# Patient Record
Sex: FEMALE | Race: WHITE | NOT HISPANIC OR LATINO | Employment: FULL TIME | URBAN - METROPOLITAN AREA
[De-identification: names, ages, dates, MRNs, and addresses within clinical notes are randomized per-mention and may not be internally consistent; named-entity substitution may affect disease eponyms.]

---

## 2018-07-11 ENCOUNTER — HOSPITAL ENCOUNTER (OUTPATIENT)
Dept: OBGYN | Facility: HOSPITAL | Age: 28
Discharge: HOME HEALTH | End: 2018-07-11
Attending: OBSTETRICS & GYNECOLOGY | Admitting: OBSTETRICS & GYNECOLOGY

## 2018-07-11 LAB — RUPTURE OF FETAL MEMBRANES BY ROM PLUS: NEGATIVE

## 2018-07-11 RX ORDER — DIETARY SUPPLEMENT
1 CAPSULE ORAL DAILY
Status: SHIPPED | COMMUNITY
Start: 2013-02-11

## 2018-07-11 ASSESSMENT — MIFFLIN-ST. JEOR: SCORE: 1545.03

## 2018-08-12 ENCOUNTER — COMMUNICATION - HEALTHEAST (OUTPATIENT)
Dept: OBGYN | Facility: HOSPITAL | Age: 28
End: 2018-08-12

## 2018-08-12 ENCOUNTER — HOSPITAL ENCOUNTER (OUTPATIENT)
Dept: OBGYN | Facility: HOSPITAL | Age: 28
Discharge: HOME | End: 2018-08-12
Attending: OBSTETRICS & GYNECOLOGY | Admitting: OBSTETRICS & GYNECOLOGY

## 2018-08-12 LAB — RUPTURE OF FETAL MEMBRANES BY ROM PLUS: NEGATIVE

## 2018-08-15 ENCOUNTER — COMMUNICATION - HEALTHEAST (OUTPATIENT)
Dept: OBGYN | Facility: HOSPITAL | Age: 28
End: 2018-08-15

## 2018-08-16 ENCOUNTER — ANESTHESIA - HEALTHEAST (OUTPATIENT)
Dept: OBGYN | Facility: HOSPITAL | Age: 28
End: 2018-08-16

## 2018-08-19 ENCOUNTER — HOME CARE/HOSPICE - HEALTHEAST (OUTPATIENT)
Dept: HOME HEALTH SERVICES | Facility: HOME HEALTH | Age: 28
End: 2018-08-19

## 2018-08-20 ENCOUNTER — HOSPITAL ENCOUNTER (OUTPATIENT)
Dept: OBGYN | Facility: HOSPITAL | Age: 28
Discharge: HOME | End: 2018-08-20

## 2018-08-20 ENCOUNTER — HOME CARE/HOSPICE - HEALTHEAST (OUTPATIENT)
Dept: HOME HEALTH SERVICES | Facility: HOME HEALTH | Age: 28
End: 2018-08-20

## 2018-08-21 ENCOUNTER — COMMUNICATION - HEALTHEAST (OUTPATIENT)
Dept: OBGYN | Facility: HOSPITAL | Age: 28
End: 2018-08-21

## 2019-01-19 ENCOUNTER — OFFICE VISIT - HEALTHEAST (OUTPATIENT)
Dept: FAMILY MEDICINE | Facility: CLINIC | Age: 29
End: 2019-01-19

## 2019-01-19 DIAGNOSIS — R05.9 COUGH: ICD-10-CM

## 2019-01-19 DIAGNOSIS — J01.00 ACUTE MAXILLARY SINUSITIS, RECURRENCE NOT SPECIFIED: ICD-10-CM

## 2019-01-19 DIAGNOSIS — H10.33 ACUTE BACTERIAL CONJUNCTIVITIS OF BOTH EYES: ICD-10-CM

## 2019-01-19 DIAGNOSIS — R07.0 THROAT PAIN: ICD-10-CM

## 2019-01-19 DIAGNOSIS — J02.9 ACUTE PHARYNGITIS, UNSPECIFIED ETIOLOGY: ICD-10-CM

## 2019-01-19 LAB
BASOPHILS # BLD AUTO: 0 THOU/UL (ref 0–0.2)
BASOPHILS NFR BLD AUTO: 1 % (ref 0–2)
EOSINOPHIL # BLD AUTO: 0.1 THOU/UL (ref 0–0.4)
EOSINOPHIL NFR BLD AUTO: 2 % (ref 0–6)
ERYTHROCYTE [DISTWIDTH] IN BLOOD BY AUTOMATED COUNT: 11.5 % (ref 11–14.5)
FLUAV AG SPEC QL IA: NORMAL
FLUBV AG SPEC QL IA: NORMAL
HCT VFR BLD AUTO: 39.4 % (ref 35–47)
HGB BLD-MCNC: 13.2 G/DL (ref 12–16)
LYMPHOCYTES # BLD AUTO: 1.5 THOU/UL (ref 0.8–4.4)
LYMPHOCYTES NFR BLD AUTO: 20 % (ref 20–40)
MCH RBC QN AUTO: 30.6 PG (ref 27–34)
MCHC RBC AUTO-ENTMCNC: 33.5 G/DL (ref 32–36)
MCV RBC AUTO: 91 FL (ref 80–100)
MONOCYTES # BLD AUTO: 0.7 THOU/UL (ref 0–0.9)
MONOCYTES NFR BLD AUTO: 9 % (ref 2–10)
MONOCYTES NFR BLD AUTO: NEGATIVE %
NEUTROPHILS # BLD AUTO: 5 THOU/UL (ref 2–7.7)
NEUTROPHILS NFR BLD AUTO: 69 % (ref 50–70)
PLATELET # BLD AUTO: 190 THOU/UL (ref 140–440)
PMV BLD AUTO: 7.9 FL (ref 7–10)
RBC # BLD AUTO: 4.31 MILL/UL (ref 3.8–5.4)
WBC: 7.3 THOU/UL (ref 4–11)

## 2019-01-19 RX ORDER — PREDNISONE 20 MG/1
TABLET ORAL
Qty: 15 TABLET | Refills: 0 | Status: SHIPPED | OUTPATIENT
Start: 2019-01-19

## 2019-01-19 RX ORDER — POLYMYXIN B SULFATE AND TRIMETHOPRIM 1; 10000 MG/ML; [USP'U]/ML
1 SOLUTION OPHTHALMIC 4 TIMES DAILY
Qty: 10 ML | Refills: 0 | Status: SHIPPED | OUTPATIENT
Start: 2019-01-19

## 2019-01-19 RX ORDER — DOCUSATE SODIUM 100 MG/1
100 CAPSULE, LIQUID FILLED ORAL 2 TIMES DAILY
Status: SHIPPED | COMMUNITY
Start: 2019-01-19

## 2020-01-30 ENCOUNTER — OFFICE VISIT - HEALTHEAST (OUTPATIENT)
Dept: FAMILY MEDICINE | Facility: CLINIC | Age: 30
End: 2020-01-30

## 2020-01-30 DIAGNOSIS — J06.9 VIRAL UPPER RESPIRATORY TRACT INFECTION WITH COUGH: ICD-10-CM

## 2020-01-30 DIAGNOSIS — R68.89 FLU-LIKE SYMPTOMS: ICD-10-CM

## 2020-01-30 LAB
DEPRECATED S PYO AG THROAT QL EIA: NORMAL
FLUAV AG SPEC QL IA: NORMAL
FLUBV AG SPEC QL IA: NORMAL

## 2020-01-31 LAB — GROUP A STREP BY PCR: NORMAL

## 2021-06-01 VITALS — HEIGHT: 70 IN | BODY MASS INDEX: 23.98 KG/M2 | WEIGHT: 167.5 LBS

## 2021-06-02 VITALS — BODY MASS INDEX: 22.27 KG/M2 | WEIGHT: 155.2 LBS

## 2021-06-04 VITALS
DIASTOLIC BLOOD PRESSURE: 72 MMHG | BODY MASS INDEX: 23.1 KG/M2 | HEART RATE: 84 BPM | TEMPERATURE: 97.9 F | SYSTOLIC BLOOD PRESSURE: 107 MMHG | WEIGHT: 161 LBS | OXYGEN SATURATION: 97 %

## 2021-06-05 NOTE — PROGRESS NOTES
Walk In Care Note                                                        Date of Visit: 1/30/2020     Chief Complaint   Melva Adams is a(n) 30 y.o. White or  female who presents to [facility] with the following complaint(s):  Sore Throat and Fever (x 3 days)       Assessment and Plan   1. Viral upper respiratory tract infection with cough    2. Flu-like symptoms  - Rapid Strep A Screen-Throat  - Influenza A/B Rapid Test- Nasal Swab  - Group A Strep, RNA Direct Detection, Throat      Strep screen is negative. Reflex strep testing is in process; will prescribe amoxicillin if positive. Influenza A/B test negative. Discussed symptomatic/supportive cares, including rest, hydration, and use of alternating doses of over-the-counter acetaminophen and ibuprofen as needed to manage fever and discomfort.     Counseled patient regarding assessment and plan for evaluation and treatment. Questions were answered. See AVS for the specific written instructions and educational handout(s) regarding viral URI that were provided at the conclusion of the visit.     Discussed signs / symptoms that warrant urgent / emergent medical attention.     Follow up with Primary Care in 1 week if symptoms persist.      History of Present Illness   Primary symptom: Flu / Cold / Cough  Onset: 2 days ago  Progression: Persisting, improved slightly today  Fevers: Yes, Tmax 103.4 F  Chills: Yes  Sore throat: Yes  Nasal congestion: Yes  Rhinorrhea: Yes, clear, yellow at times  Ear pain: Mild, right  Headache: Yes  Body aches: Yes  Cough: Yes, wet  Shortness of breath: No  GI symptoms: Nausea and loose stools  Additional symptoms: Sinus pressure  Home therapies utilized: Acetaminophen and ibuprofen  Underlying lung disease: No  Other ill contacts: Daughter has had cold / cough and tested negative for strep, influenza, and RSV 5-6 days ago.      Review of Systems   Review of Systems   All other systems reviewed and are negative.        Physical Exam   Vitals:    01/30/20 1016   BP: 107/72   Patient Site: Right Arm   Patient Position: Sitting   Cuff Size: Adult Regular   Pulse: 84   Temp: 97.9  F (36.6  C)   TempSrc: Oral   SpO2: 97%   Weight: 161 lb (73 kg)     Physical Exam  Vitals signs and nursing note reviewed.   Constitutional:       General: She is not in acute distress.     Appearance: She is well-developed and normal weight. She is not ill-appearing or toxic-appearing.   HENT:      Head: Normocephalic and atraumatic.      Right Ear: Tympanic membrane, ear canal and external ear normal.      Left Ear: Tympanic membrane, ear canal and external ear normal.      Nose: Mucosal edema present. No rhinorrhea.      Mouth/Throat:      Mouth: Mucous membranes are moist. No oral lesions.      Pharynx: Uvula midline. No oropharyngeal exudate or posterior oropharyngeal erythema.      Tonsils: No tonsillar exudate. 1+ on the right. 1+ on the left.   Eyes:      General: Lids are normal.      Conjunctiva/sclera: Conjunctivae normal.   Neck:      Musculoskeletal: Neck supple. No edema or erythema.   Cardiovascular:      Rate and Rhythm: Normal rate and regular rhythm.      Heart sounds: S1 normal and S2 normal. No murmur. No friction rub. No gallop.    Pulmonary:      Effort: Pulmonary effort is normal.      Breath sounds: Normal breath sounds. No stridor. No wheezing, rhonchi or rales.   Lymphadenopathy:      Cervical: No cervical adenopathy.   Skin:     General: Skin is warm and dry.      Coloration: Skin is not pale.      Findings: No rash.   Neurological:      General: No focal deficit present.      Mental Status: She is alert and oriented to person, place, and time.          Diagnostic Studies   Laboratory:  Results for orders placed or performed in visit on 01/30/20   Rapid Strep A Screen-Throat   Result Value Ref Range    Rapid Strep A Antigen No Group A Strep detected, presumptive negative No Group A Strep detected, presumptive negative    Influenza A/B Rapid Test- Nasal Swab   Result Value Ref Range    Influenza  A, Rapid Antigen No Influenza A antigen detected No Influenza A antigen detected    Influenza B, Rapid Antigen No Influenza B antigen detected No Influenza B antigen detected     Radiology:  N/A  Electrocardiogram:  N/A     Procedure Note   N/A     Pertinent History   The following portions of the patient's history were reviewed and updated as appropriate: allergies, current medications, past family history, past medical history, past social history, past surgical history and problem list.    Patient has Vaginal delivery on their problem list.    Patient has a past medical history of Mental disorder.    Patient has a past surgical history that includes Bunionectomy (2003); left ovary (Left, 2017); and Dilation and curettage of uterus (2017).    Patient's family history is not on file.    Patient reports that she has never smoked. She has never used smokeless tobacco. She reports that she does not drink alcohol or use drugs.     Portions of this note have been dictated using voice recognition software. Any grammatical or contextual distortions are unintentional and inherent to the software.    Billy Wright MD  [facility], [place]

## 2021-06-18 NOTE — PATIENT INSTRUCTIONS - HE
Patient Instructions by Billy Wright MD at 1/30/2020 10:10 AM     Author: Billy Wright MD Service: -- Author Type: Physician    Filed: 1/30/2020 11:23 AM Encounter Date: 1/30/2020 Status: Addendum    : Billy Wright MD (Physician)    Related Notes: Original Note by Billy Wright MD (Physician) filed at 1/30/2020 11:22 AM       -Rapid strep test is negative.  A confirmatory strep test is in process and will be finalized tomorrow.  -We will only reach out to you if the confirmatory strep test is positive.  An antibiotic will be prescribed if this test is positive.  -Influenza A & B test is negative.  -Recommend rest, hydration, and over the counter pain relievers as needed for fever and discomfort.  Patient Education     Viral Upper Respiratory Illness (Adult)  You have a viral upper respiratory illness (URI), which is another term for the common cold. This illness is contagious during the first few days. It is spread through the air by coughing and sneezing. It may also be spread by direct contact (touching the sick person and then touching your own eyes, nose, or mouth). Frequent handwashing will decrease risk of spread. Most viral illnesses go away within 7 to 10 days with rest and simple home remedies. Sometimes the illness may last for several weeks. Antibiotics will not kill a virus, and they are generally not prescribed for this condition.    Home care    If symptoms are severe, rest at home for the first 2 to 3 days. When you resume activity, don't let yourself get too tired.    Avoid being exposed to cigarette smoke (yours or others).    You may use acetaminophen or ibuprofen to control pain and fever, unless another medicine was prescribed. If you have chronic liver or kidney disease, have ever had a stomach ulcer or gastrointestinal bleeding, or are taking blood-thinning medicines, talk with your healthcare provider before using these medicines. Aspirin should never be given  to anyone under 18 years of age who is ill with a viral infection or fever. It may cause severe liver or brain damage.    Your appetite may be poor, so a light diet is fine. Avoid dehydration by drinking 6 to 8 glasses of fluids per day (water, soft drinks, juices, tea, or soup). Extra fluids will help loosen secretions in the nose and lungs.    Over-the-counter cold medicines will not shorten the length of time youre sick, but they may be helpful for the following symptoms: cough, sore throat, and nasal and sinus congestion. (Note: Do not use decongestants if you have high blood pressure.)  Follow-up care  Follow up with your healthcare provider, or as advised.  When to seek medical advice  Call your healthcare provider right away if any of these occur:    Cough with lots of colored sputum (mucus)    Severe headache; face, neck, or ear pain    Difficulty swallowing due to throat pain    Fever of 100.4 F (38 C) or higher, or as directed by your healthcare provider  Call 911  Call 911 if any of these occur:    Chest pain, shortness of breath, wheezing, or difficulty breathing    Coughing up blood    Inability to swallow due to throat pain  Date Last Reviewed: 9/13/2015 2000-2017 [employer]. [address] [place] [address], [place] [address]. All rights reserved. This information is not intended as a substitute for professional medical care. Always follow your healthcare professional's instructions.

## 2021-06-19 NOTE — ANESTHESIA PROCEDURE NOTES
Epidural Block    Patient location during procedure: OB  Time Called: 8/16/2018 7:35 AM  Reason for Block:labor epidural  Staffing:  Performing  Anesthesiologist: SKYLER GÓMEZ  Preanesthetic Checklist  Completed: patient identified, risks, benefits, and alternatives discussed, timeout performed, consent obtained, airway assessed, oxygen available, suction available, emergency drugs available and hand hygiene performed  Procedure  Patient position: sitting  Prep: ChloraPrep  Patient monitoring: continuous pulse oximetry, heart rate and blood pressure  Approach: midline  Location: L1-L2  Injection technique: GARLAND saline  Number of Attempts:1  Needle  Needle type: Carlos Eduardo   Needle gauge: 18 G     Catheter in Space: 5 (GARLAND at 5 cm)  Assessment  Sensory level:  No complications    Events: paresthesia     Additional Notes:  Transient paresthesia down left leg, resolved when catheter pulled back

## 2021-06-19 NOTE — ANESTHESIA PREPROCEDURE EVALUATION
Anesthesia Evaluation      Patient summary reviewed   No history of anesthetic complications     Airway   Mallampati: II  Neck ROM: full   Pulmonary     breath sounds clear to auscultation  (-) COPD, asthma, shortness of breath, recent URI, sleep apnea, rhonchi, wheezes, rales, stridor, not a smoker                         Cardiovascular   Exercise tolerance: > or = 4 METS  (-) hypertension, past MI, CAD, angina, CHF, murmur  ECG reviewed  Rhythm: regular  Rate: normal,    no murmur      Neuro/Psych    (+) anxiety/panic attacks,   (-) no seizures, no CVA, no depression, no dementia, no chronic pain    Endo/Other    (+) pregnant  (-) no diabetes, hypothyroidism, no obesity     GI/Hepatic/Renal    (+) GERD,        Other findings: Labs 8/1 6/18:  Hgb 12.4, Plt 135      Dental - normal exam     Comment: No loose, chipped, partial, removable or dentures.                         Anesthesia Plan  Planned anesthetic: epidural    ASA 2     Anesthetic plan and risks discussed with: patient, spouse and parent/guardian    Post-op plan: routine recovery

## 2021-06-19 NOTE — PROGRESS NOTES
Pt arrived [facility] unit around 0230 with c/o rupture of membrane and less fetal movement per pt. ROMplus done. Applied EFM. NST reactive. ROMplus negative. Dr. Noel updated. Received order to discharge patient.   Discharge instruction and follow up explained, pt verbalizes understanding.

## 2021-06-19 NOTE — ANESTHESIA POSTPROCEDURE EVALUATION
Patient: Melva Adams  * No procedures listed *  Anesthesia type: epidural    Patient location: Labor and Delivery  Last vitals:   Vitals:    08/17/18 0340   BP: 99/54   Pulse: 64   Resp: 18   Temp: 36.8  C (98.3  F)   SpO2:      Post vital signs: stable  Level of consciousness: awake and responds to simple questions  Post-anesthesia pain: pain controlled  Post-anesthesia nausea and vomiting: no  Pulmonary: unassisted, return to baseline  Cardiovascular: stable and blood pressure at baseline  Hydration: adequate  Anesthetic events: no    QCDR Measures:  ASA# 11 - Nani-op Cardiac Arrest: ASA11B - Patient did NOT experience unanticipated cardiac arrest  ASA# 12 - Nani-op Mortality Rate: ASA12B - Patient did NOT die  ASA# 13 - PACU Re-Intubation Rate: NA - No ETT / LMA used for case  ASA# 10 - Composite Anes Safety: ASA10A - No serious adverse event    Additional Notes:

## 2021-06-23 NOTE — PATIENT INSTRUCTIONS - HE
Fluids, rest, steam, nasal saline, humidifier  Prednisone 40mg daily for 3-5 days to help with throat inflammation and swelling and pain  Polytrim eye drops both eyes 4 times a day for a week or so  If congestion and sinus pressure persists into this week, may need to reevaluate for a secondary bacterial sinus infection though at this time it all seems very viral still  Influenza test and mono test were negative, normal white count

## 2021-06-23 NOTE — PROGRESS NOTES
Assessment/Plan:   Throat pain, Cough  Acute bacterial conjunctivitis of both eyes  Acute pharyngitis, unspecified etiology  Acute maxillary sinusitis, recurrence not specified  Off and on URI for weeks. Last Saturday developed ST and cough.  Fever, chills, body aches on Tuesday. Peaked on Thursday. RST negative that day at another clinic. Goopy eyes since yesterday. Sinus pain and pressure and throat still very painful. Suspect new acute viral illness, flu negative, monospot negative. CBC normal. Green nasal drainage, also consider secondary sinusitis from prolonged congestion. Will cover with antibiotics and eyedrops and some prednisone she can use for throat swelling and pain if not improving with antiboitics. Breast feeding, daughter has been on amoxicillin and Augmentin for ear infections recently.     - Mononucleosis Screen  - HM1(CBC and Differential)  - HM1 (CBC with Diff)  - Influenza A/B Rapid Test  - polymyxin B-trimethoprim (FOR POLYTRIM) 10,000 unit- 1 mg/mL Drop ophthalmic drops; Administer 1 drop to both eyes 4 (four) times a day.  Dispense: 10 mL; Refill: 0  - predniSONE (DELTASONE) 20 MG tablet; 40mg daily for 3-5 days.  Dispense: 15 tablet; Refill: 0  - amoxicillin-clavulanate (AUGMENTIN) 875-125 mg per tablet; Take 1 tablet by mouth 2 (two) times a day for 10 days.  Dispense: 20 tablet; Refill: 0    Fluids, rest, steam, nasal saline, humidifier  Prednisone 40mg daily for 3-5 days to help with throat inflammation and swelling and pain  Polytrim eye drops both eyes 4 times a day for a week or so  If congestion and sinus pressure persists into this week, may need to reevaluate for a secondary bacterial sinus infection though at this time it all seems very viral still  Influenza test and mono test were negative, normal white count    Subjective:      Melva Adams is a 28 y.o. female who presents with throat pain, congestion and a goopy eye. She has been sick for over a week now. She has had URI  and cough and ST off and on for weeks - she has an infant in  and she has been ill with something - pneumonia, ear infections - since November. A week ago, on Saturday, she developed a very painful throat. On Sunday, more congestion and cough.  Tuesday and Wednesday she had fever and chills and body aches which peaked on Thursday when she went in for a strep test somewhere else which was negative. She has continued to have severe ST making it hard to swallow and eat. Increasing sinus pain and pressure as well the last couple days and since last night, she has had a goopy eye.  It started on the right side - she thinks she had an eyelash in the eye and in the process of removing it caused an infection because it has had green mucus and tearing and redness since then.  Today it has moved to the left as well. No photophobia, no foreign body sensation any longer. Her  has started having a ST as well and her daughter is congested and coughing. She is breastfeeding. Her daughter recently completed amoxicillin followed by augmentin and tolerated those but doesn't really seem better. No wheeze or shortness of breath, no leg swelling or calf tenderness, no urinary sxs, no rash. No breast tenderness, firmness, pain or redness to suggest a mastitis causing malaise and fever.  She has been fatigued and not hungry but is trying to drink enough. Adequate milk supply. No history of asthma, not smoking. NKDA    Allergies   Allergen Reactions     Gluten Diarrhea     sensitivity     Milk Diarrhea     Milk products     Soy Diarrhea     Current Outpatient Medications on File Prior to Visit   Medication Sig Dispense Refill     calcium carbonate-vitamin D3 (CALCIUM 500 + D, D3,) 500 mg(1,250mg) -125 unit per tablet Take 1 capsule by mouth daily.       dietary supplement cap Take 1 capsule by mouth daily.       docusate sodium (COLACE) 100 MG capsule Take 100 mg by mouth 2 (two) times a day.       No current  facility-administered medications on file prior to visit.      Patient Active Problem List   Diagnosis     Vaginal delivery       Objective:     /65 (Patient Site: Right Arm, Patient Position: Sitting, Cuff Size: Adult Regular)   Pulse 71   Temp 98.2  F (36.8  C) (Oral)   Wt 155 lb 3.2 oz (70.4 kg)   SpO2 98%   Breastfeeding? Yes   BMI 22.27 kg/m      Physical  General Appearance: Alert, cooperative, no distress, ill appearing  Head: Normocephalic, without obvious abnormality, atraumatic  Eyes: Conjunctivae are injected both bulbar and palpebral surface R>L. Scant green mucus in the corners and lashes (lower). Extraocular movements are intact. PERRLA, no photophobia  Ears: Normal TMs and external ear canals, both ears  Nose: congestion, red swollen turbinates with sinus pain on percussion.  Throat: Throat is red, mainly posteriorly, mildly enlarged tonsils bilaterally, uvula midline. No exudate.  No significant lesions or palatal petechiae.   Neck: tender anterior adenopathy  Lungs: Clear to auscultation bilaterally, respirations unlabored  Heart: Regular rate and rhythm  Abdomen: Soft, non-tender, no liver or spleen tenderness or enlargement, no CVA tenderness with percussion  Skin: Skin color, texture, turgor normal, no rashes or lesions  Psychiatric: easily tearful.        Recent Results (from the past 24 hour(s))   Influenza A/B Rapid Test   Result Value Ref Range    Influenza  A, Rapid Antigen No Influenza A antigen detected No Influenza A antigen detected    Influenza B, Rapid Antigen No Influenza B antigen detected No Influenza B antigen detected   Mononucleosis Screen   Result Value Ref Range    Mono Screen Negative Negative   HM1 (CBC with Diff)   Result Value Ref Range    WBC 7.3 4.0 - 11.0 thou/uL    RBC 4.31 3.80 - 5.40 mill/uL    Hemoglobin 13.2 12.0 - 16.0 g/dL    Hematocrit 39.4 35.0 - 47.0 %    MCV 91 80 - 100 fL    MCH 30.6 27.0 - 34.0 pg    MCHC 33.5 32.0 - 36.0 g/dL    RDW 11.5 11.0 -  14.5 %    Platelets 190 140 - 440 thou/uL    MPV 7.9 7.0 - 10.0 fL    Neutrophils % 69 50 - 70 %    Lymphocytes % 20 20 - 40 %    Monocytes % 9 2 - 10 %    Eosinophils % 2 0 - 6 %    Basophils % 1 0 - 2 %    Neutrophils Absolute 5.0 2.0 - 7.7 thou/uL    Lymphocytes Absolute 1.5 0.8 - 4.4 thou/uL    Monocytes Absolute 0.7 0.0 - 0.9 thou/uL    Eosinophils Absolute 0.1 0.0 - 0.4 thou/uL    Basophils Absolute 0.0 0.0 - 0.2 thou/uL

## 2021-06-26 ENCOUNTER — HEALTH MAINTENANCE LETTER (OUTPATIENT)
Age: 31
End: 2021-06-26

## 2021-07-14 PROBLEM — Z34.90 PREGNANT: Status: RESOLVED | Noted: 2018-08-16 | Resolved: 2018-08-17

## 2021-10-16 ENCOUNTER — HEALTH MAINTENANCE LETTER (OUTPATIENT)
Age: 31
End: 2021-10-16

## 2021-12-16 DIAGNOSIS — Z33.1 PREGNANT STATE, INCIDENTAL: Primary | ICD-10-CM

## 2021-12-17 ENCOUNTER — LAB (OUTPATIENT)
Dept: LAB | Facility: CLINIC | Age: 31
End: 2021-12-17
Attending: NURSE PRACTITIONER
Payer: COMMERCIAL

## 2021-12-17 DIAGNOSIS — Z33.1 PREGNANT STATE, INCIDENTAL: ICD-10-CM

## 2021-12-17 LAB — SARS-COV-2 RNA RESP QL NAA+PROBE: NEGATIVE

## 2021-12-17 PROCEDURE — U0005 INFEC AGEN DETEC AMPLI PROBE: HCPCS

## 2021-12-20 ENCOUNTER — HOSPITAL ENCOUNTER (INPATIENT)
Facility: HOSPITAL | Age: 31
LOS: 3 days | Discharge: HOME HEALTH | End: 2021-12-23
Attending: OBSTETRICS & GYNECOLOGY | Admitting: OBSTETRICS & GYNECOLOGY
Payer: COMMERCIAL

## 2021-12-20 PROBLEM — Z34.90 ENCOUNTER FOR ELECTIVE INDUCTION OF LABOR: Status: ACTIVE | Noted: 2021-12-20

## 2021-12-20 PROBLEM — Z36.89 ENCOUNTER FOR TRIAGE IN PREGNANT PATIENT: Status: ACTIVE | Noted: 2021-12-20

## 2021-12-20 LAB
ABO/RH(D): NORMAL
ANTIBODY SCREEN: NEGATIVE
HEPATITIS B SURFACE ANTIGEN (EXTERNAL): NONREACTIVE
HOLD SPECIMEN: NORMAL
SPECIMEN EXPIRATION DATE: NORMAL

## 2021-12-20 PROCEDURE — 120N000001 HC R&B MED SURG/OB

## 2021-12-20 PROCEDURE — 36415 COLL VENOUS BLD VENIPUNCTURE: CPT | Performed by: OBSTETRICS & GYNECOLOGY

## 2021-12-20 PROCEDURE — 86901 BLOOD TYPING SEROLOGIC RH(D): CPT | Performed by: OBSTETRICS & GYNECOLOGY

## 2021-12-20 PROCEDURE — 250N000013 HC RX MED GY IP 250 OP 250 PS 637: Performed by: OBSTETRICS & GYNECOLOGY

## 2021-12-20 PROCEDURE — 3E0P7VZ INTRODUCTION OF HORMONE INTO FEMALE REPRODUCTIVE, VIA NATURAL OR ARTIFICIAL OPENING: ICD-10-PCS | Performed by: OBSTETRICS & GYNECOLOGY

## 2021-12-20 PROCEDURE — 86780 TREPONEMA PALLIDUM: CPT | Performed by: OBSTETRICS & GYNECOLOGY

## 2021-12-20 RX ORDER — NALOXONE HYDROCHLORIDE 0.4 MG/ML
0.2 INJECTION, SOLUTION INTRAMUSCULAR; INTRAVENOUS; SUBCUTANEOUS
Status: DISCONTINUED | OUTPATIENT
Start: 2021-12-20 | End: 2021-12-21 | Stop reason: HOSPADM

## 2021-12-20 RX ORDER — MISOPROSTOL 100 UG/1
25 TABLET ORAL
Status: DISCONTINUED | OUTPATIENT
Start: 2021-12-20 | End: 2021-12-21 | Stop reason: HOSPADM

## 2021-12-20 RX ORDER — NALOXONE HYDROCHLORIDE 0.4 MG/ML
0.4 INJECTION, SOLUTION INTRAMUSCULAR; INTRAVENOUS; SUBCUTANEOUS
Status: DISCONTINUED | OUTPATIENT
Start: 2021-12-20 | End: 2021-12-21 | Stop reason: HOSPADM

## 2021-12-20 RX ORDER — METOCLOPRAMIDE HYDROCHLORIDE 5 MG/ML
10 INJECTION INTRAMUSCULAR; INTRAVENOUS EVERY 6 HOURS PRN
Status: DISCONTINUED | OUTPATIENT
Start: 2021-12-20 | End: 2021-12-21 | Stop reason: HOSPADM

## 2021-12-20 RX ORDER — MISOPROSTOL 200 UG/1
800 TABLET ORAL
Status: DISCONTINUED | OUTPATIENT
Start: 2021-12-20 | End: 2021-12-21 | Stop reason: HOSPADM

## 2021-12-20 RX ORDER — KETOROLAC TROMETHAMINE 30 MG/ML
30 INJECTION, SOLUTION INTRAMUSCULAR; INTRAVENOUS
Status: DISCONTINUED | OUTPATIENT
Start: 2021-12-20 | End: 2021-12-23 | Stop reason: HOSPADM

## 2021-12-20 RX ORDER — OXYTOCIN/0.9 % SODIUM CHLORIDE 30/500 ML
100-340 PLASTIC BAG, INJECTION (ML) INTRAVENOUS CONTINUOUS PRN
Status: DISCONTINUED | OUTPATIENT
Start: 2021-12-20 | End: 2021-12-23 | Stop reason: HOSPADM

## 2021-12-20 RX ORDER — LIDOCAINE 40 MG/G
CREAM TOPICAL
Status: DISCONTINUED | OUTPATIENT
Start: 2021-12-20 | End: 2021-12-20 | Stop reason: HOSPADM

## 2021-12-20 RX ORDER — METHYLERGONOVINE MALEATE 0.2 MG/ML
200 INJECTION INTRAVENOUS
Status: DISCONTINUED | OUTPATIENT
Start: 2021-12-20 | End: 2021-12-21 | Stop reason: HOSPADM

## 2021-12-20 RX ORDER — MORPHINE SULFATE 10 MG/ML
10 INJECTION, SOLUTION INTRAMUSCULAR; INTRAVENOUS
Status: DISCONTINUED | OUTPATIENT
Start: 2021-12-20 | End: 2021-12-21 | Stop reason: HOSPADM

## 2021-12-20 RX ORDER — ONDANSETRON 4 MG/1
4 TABLET, FILM COATED ORAL ONCE
COMMUNITY

## 2021-12-20 RX ORDER — MISOPROSTOL 200 UG/1
400 TABLET ORAL
Status: DISCONTINUED | OUTPATIENT
Start: 2021-12-20 | End: 2021-12-21 | Stop reason: HOSPADM

## 2021-12-20 RX ORDER — METOCLOPRAMIDE 10 MG/1
10 TABLET ORAL EVERY 6 HOURS PRN
Status: DISCONTINUED | OUTPATIENT
Start: 2021-12-20 | End: 2021-12-21 | Stop reason: HOSPADM

## 2021-12-20 RX ORDER — CALCIUM CARBONATE 500 MG/1
500 TABLET, CHEWABLE ORAL DAILY PRN
Status: DISCONTINUED | OUTPATIENT
Start: 2021-12-20 | End: 2021-12-21

## 2021-12-20 RX ORDER — OXYTOCIN 10 [USP'U]/ML
10 INJECTION, SOLUTION INTRAMUSCULAR; INTRAVENOUS
Status: DISCONTINUED | OUTPATIENT
Start: 2021-12-20 | End: 2021-12-23 | Stop reason: HOSPADM

## 2021-12-20 RX ORDER — TERBUTALINE SULFATE 1 MG/ML
0.25 INJECTION, SOLUTION SUBCUTANEOUS
Status: DISCONTINUED | OUTPATIENT
Start: 2021-12-20 | End: 2021-12-21 | Stop reason: HOSPADM

## 2021-12-20 RX ORDER — ONDANSETRON 4 MG/1
4 TABLET, ORALLY DISINTEGRATING ORAL EVERY 6 HOURS PRN
Status: DISCONTINUED | OUTPATIENT
Start: 2021-12-20 | End: 2021-12-21 | Stop reason: HOSPADM

## 2021-12-20 RX ORDER — IBUPROFEN 600 MG/1
600 TABLET, FILM COATED ORAL
Status: DISCONTINUED | OUTPATIENT
Start: 2021-12-20 | End: 2021-12-23 | Stop reason: HOSPADM

## 2021-12-20 RX ORDER — OXYTOCIN 10 [USP'U]/ML
10 INJECTION, SOLUTION INTRAMUSCULAR; INTRAVENOUS
Status: DISCONTINUED | OUTPATIENT
Start: 2021-12-20 | End: 2021-12-21 | Stop reason: HOSPADM

## 2021-12-20 RX ORDER — FENTANYL CITRATE 50 UG/ML
50-100 INJECTION, SOLUTION INTRAMUSCULAR; INTRAVENOUS
Status: DISCONTINUED | OUTPATIENT
Start: 2021-12-20 | End: 2021-12-21 | Stop reason: HOSPADM

## 2021-12-20 RX ORDER — PROCHLORPERAZINE 25 MG
25 SUPPOSITORY, RECTAL RECTAL EVERY 12 HOURS PRN
Status: DISCONTINUED | OUTPATIENT
Start: 2021-12-20 | End: 2021-12-21 | Stop reason: HOSPADM

## 2021-12-20 RX ORDER — PROCHLORPERAZINE MALEATE 10 MG
10 TABLET ORAL EVERY 6 HOURS PRN
Status: DISCONTINUED | OUTPATIENT
Start: 2021-12-20 | End: 2021-12-21 | Stop reason: HOSPADM

## 2021-12-20 RX ORDER — CARBOPROST TROMETHAMINE 250 UG/ML
250 INJECTION, SOLUTION INTRAMUSCULAR
Status: DISCONTINUED | OUTPATIENT
Start: 2021-12-20 | End: 2021-12-21 | Stop reason: HOSPADM

## 2021-12-20 RX ORDER — OXYTOCIN/0.9 % SODIUM CHLORIDE 30/500 ML
340 PLASTIC BAG, INJECTION (ML) INTRAVENOUS CONTINUOUS PRN
Status: DISCONTINUED | OUTPATIENT
Start: 2021-12-20 | End: 2021-12-21 | Stop reason: HOSPADM

## 2021-12-20 RX ORDER — HYDROXYZINE HYDROCHLORIDE 50 MG/1
50 TABLET, FILM COATED ORAL
Status: DISCONTINUED | OUTPATIENT
Start: 2021-12-20 | End: 2021-12-21 | Stop reason: HOSPADM

## 2021-12-20 RX ORDER — ONDANSETRON 2 MG/ML
4 INJECTION INTRAMUSCULAR; INTRAVENOUS EVERY 6 HOURS PRN
Status: DISCONTINUED | OUTPATIENT
Start: 2021-12-20 | End: 2021-12-21 | Stop reason: HOSPADM

## 2021-12-20 RX ORDER — CITRIC ACID/SODIUM CITRATE 334-500MG
30 SOLUTION, ORAL ORAL ONCE
Status: DISCONTINUED | OUTPATIENT
Start: 2021-12-20 | End: 2021-12-21 | Stop reason: HOSPADM

## 2021-12-20 RX ADMIN — CALCIUM CARBONATE (ANTACID) CHEW TAB 500 MG 500 MG: 500 CHEW TAB at 21:31

## 2021-12-20 RX ADMIN — MISOPROSTOL 25 MCG: 100 TABLET ORAL at 22:45

## 2021-12-20 RX ADMIN — MISOPROSTOL 25 MCG: 100 TABLET ORAL at 18:47

## 2021-12-20 RX ADMIN — MISOPROSTOL 25 MCG: 100 TABLET ORAL at 20:49

## 2021-12-20 ASSESSMENT — MIFFLIN-ST. JEOR: SCORE: 1586.76

## 2021-12-21 ENCOUNTER — ANESTHESIA (OUTPATIENT)
Dept: OBGYN | Facility: HOSPITAL | Age: 31
End: 2021-12-21
Payer: COMMERCIAL

## 2021-12-21 ENCOUNTER — ANESTHESIA EVENT (OUTPATIENT)
Dept: OBGYN | Facility: HOSPITAL | Age: 31
End: 2021-12-21
Payer: COMMERCIAL

## 2021-12-21 LAB — T PALLIDUM AB SER QL: NONREACTIVE

## 2021-12-21 PROCEDURE — 250N000013 HC RX MED GY IP 250 OP 250 PS 637: Performed by: OBSTETRICS & GYNECOLOGY

## 2021-12-21 PROCEDURE — 250N000011 HC RX IP 250 OP 636: Performed by: ANESTHESIOLOGY

## 2021-12-21 PROCEDURE — 00HU33Z INSERTION OF INFUSION DEVICE INTO SPINAL CANAL, PERCUTANEOUS APPROACH: ICD-10-PCS | Performed by: ANESTHESIOLOGY

## 2021-12-21 PROCEDURE — 250N000009 HC RX 250: Performed by: OBSTETRICS & GYNECOLOGY

## 2021-12-21 PROCEDURE — 722N000001 HC LABOR CARE VAGINAL DELIVERY SINGLE

## 2021-12-21 PROCEDURE — 250N000011 HC RX IP 250 OP 636: Performed by: OBSTETRICS & GYNECOLOGY

## 2021-12-21 PROCEDURE — 370N000003 HC ANESTHESIA WARD SERVICE

## 2021-12-21 PROCEDURE — 258N000003 HC RX IP 258 OP 636: Performed by: OBSTETRICS & GYNECOLOGY

## 2021-12-21 PROCEDURE — 0HQ9XZZ REPAIR PERINEUM SKIN, EXTERNAL APPROACH: ICD-10-PCS | Performed by: OBSTETRICS & GYNECOLOGY

## 2021-12-21 PROCEDURE — 120N000001 HC R&B MED SURG/OB

## 2021-12-21 PROCEDURE — 3E0R3BZ INTRODUCTION OF ANESTHETIC AGENT INTO SPINAL CANAL, PERCUTANEOUS APPROACH: ICD-10-PCS | Performed by: ANESTHESIOLOGY

## 2021-12-21 PROCEDURE — 258N000003 HC RX IP 258 OP 636: Performed by: ANESTHESIOLOGY

## 2021-12-21 RX ORDER — MISOPROSTOL 200 UG/1
800 TABLET ORAL
Status: DISCONTINUED | OUTPATIENT
Start: 2021-12-21 | End: 2021-12-23 | Stop reason: HOSPADM

## 2021-12-21 RX ORDER — LIDOCAINE 40 MG/G
CREAM TOPICAL
Status: DISCONTINUED | OUTPATIENT
Start: 2021-12-21 | End: 2021-12-21 | Stop reason: HOSPADM

## 2021-12-21 RX ORDER — NALBUPHINE HYDROCHLORIDE 10 MG/ML
2.5-5 INJECTION, SOLUTION INTRAMUSCULAR; INTRAVENOUS; SUBCUTANEOUS EVERY 6 HOURS PRN
Status: DISCONTINUED | OUTPATIENT
Start: 2021-12-21 | End: 2021-12-23 | Stop reason: HOSPADM

## 2021-12-21 RX ORDER — BUPIVACAINE HYDROCHLORIDE 2.5 MG/ML
INJECTION, SOLUTION EPIDURAL; INFILTRATION; INTRACAUDAL
Status: COMPLETED | OUTPATIENT
Start: 2021-12-21 | End: 2021-12-21

## 2021-12-21 RX ORDER — OXYTOCIN/0.9 % SODIUM CHLORIDE 30/500 ML
1-24 PLASTIC BAG, INJECTION (ML) INTRAVENOUS CONTINUOUS
Status: DISCONTINUED | OUTPATIENT
Start: 2021-12-21 | End: 2021-12-21 | Stop reason: HOSPADM

## 2021-12-21 RX ORDER — IBUPROFEN 800 MG/1
800 TABLET, FILM COATED ORAL EVERY 6 HOURS PRN
Status: DISCONTINUED | OUTPATIENT
Start: 2021-12-21 | End: 2021-12-23 | Stop reason: HOSPADM

## 2021-12-21 RX ORDER — SODIUM CHLORIDE, SODIUM LACTATE, POTASSIUM CHLORIDE, CALCIUM CHLORIDE 600; 310; 30; 20 MG/100ML; MG/100ML; MG/100ML; MG/100ML
INJECTION, SOLUTION INTRAVENOUS CONTINUOUS PRN
Status: DISCONTINUED | OUTPATIENT
Start: 2021-12-21 | End: 2021-12-21 | Stop reason: HOSPADM

## 2021-12-21 RX ORDER — OXYTOCIN 10 [USP'U]/ML
10 INJECTION, SOLUTION INTRAMUSCULAR; INTRAVENOUS
Status: DISCONTINUED | OUTPATIENT
Start: 2021-12-21 | End: 2021-12-23 | Stop reason: HOSPADM

## 2021-12-21 RX ORDER — BUPIVACAINE HYDROCHLORIDE 2.5 MG/ML
10 INJECTION, SOLUTION EPIDURAL; INFILTRATION; INTRACAUDAL ONCE
Status: DISCONTINUED | OUTPATIENT
Start: 2021-12-21 | End: 2021-12-21 | Stop reason: HOSPADM

## 2021-12-21 RX ORDER — CARBOPROST TROMETHAMINE 250 UG/ML
250 INJECTION, SOLUTION INTRAMUSCULAR
Status: DISCONTINUED | OUTPATIENT
Start: 2021-12-21 | End: 2021-12-23 | Stop reason: HOSPADM

## 2021-12-21 RX ORDER — METHYLERGONOVINE MALEATE 0.2 MG/ML
200 INJECTION INTRAVENOUS
Status: DISCONTINUED | OUTPATIENT
Start: 2021-12-21 | End: 2021-12-23 | Stop reason: HOSPADM

## 2021-12-21 RX ORDER — DOCUSATE SODIUM 100 MG/1
100 CAPSULE, LIQUID FILLED ORAL 2 TIMES DAILY
Status: DISCONTINUED | OUTPATIENT
Start: 2021-12-21 | End: 2021-12-22

## 2021-12-21 RX ORDER — HYDROCORTISONE 2.5 %
CREAM (GRAM) TOPICAL 3 TIMES DAILY PRN
Status: DISCONTINUED | OUTPATIENT
Start: 2021-12-21 | End: 2021-12-23 | Stop reason: HOSPADM

## 2021-12-21 RX ORDER — BISACODYL 10 MG
10 SUPPOSITORY, RECTAL RECTAL DAILY PRN
Status: DISCONTINUED | OUTPATIENT
Start: 2021-12-21 | End: 2021-12-23 | Stop reason: HOSPADM

## 2021-12-21 RX ORDER — DOCUSATE SODIUM 100 MG/1
100 CAPSULE, LIQUID FILLED ORAL DAILY
Status: DISCONTINUED | OUTPATIENT
Start: 2021-12-22 | End: 2021-12-23 | Stop reason: HOSPADM

## 2021-12-21 RX ORDER — ACETAMINOPHEN 325 MG/1
650 TABLET ORAL EVERY 4 HOURS PRN
Status: DISCONTINUED | OUTPATIENT
Start: 2021-12-21 | End: 2021-12-23 | Stop reason: HOSPADM

## 2021-12-21 RX ORDER — MISOPROSTOL 200 UG/1
400 TABLET ORAL
Status: DISCONTINUED | OUTPATIENT
Start: 2021-12-21 | End: 2021-12-23 | Stop reason: HOSPADM

## 2021-12-21 RX ORDER — CALCIUM CARBONATE 500 MG/1
500 TABLET, CHEWABLE ORAL 2 TIMES DAILY PRN
Status: DISCONTINUED | OUTPATIENT
Start: 2021-12-21 | End: 2021-12-23 | Stop reason: HOSPADM

## 2021-12-21 RX ORDER — LIDOCAINE HYDROCHLORIDE AND EPINEPHRINE 15; 5 MG/ML; UG/ML
3 INJECTION, SOLUTION EPIDURAL
Status: DISCONTINUED | OUTPATIENT
Start: 2021-12-21 | End: 2021-12-21 | Stop reason: HOSPADM

## 2021-12-21 RX ORDER — FENTANYL CITRATE-0.9 % NACL/PF 10 MCG/ML
100 PLASTIC BAG, INJECTION (ML) INTRAVENOUS EVERY 5 MIN PRN
Status: DISCONTINUED | OUTPATIENT
Start: 2021-12-21 | End: 2021-12-21 | Stop reason: HOSPADM

## 2021-12-21 RX ORDER — OXYTOCIN/0.9 % SODIUM CHLORIDE 30/500 ML
340 PLASTIC BAG, INJECTION (ML) INTRAVENOUS CONTINUOUS PRN
Status: DISCONTINUED | OUTPATIENT
Start: 2021-12-21 | End: 2021-12-23 | Stop reason: HOSPADM

## 2021-12-21 RX ORDER — MODIFIED LANOLIN
OINTMENT (GRAM) TOPICAL
Status: DISCONTINUED | OUTPATIENT
Start: 2021-12-21 | End: 2021-12-23 | Stop reason: HOSPADM

## 2021-12-21 RX ORDER — TERBUTALINE SULFATE 1 MG/ML
0.25 INJECTION, SOLUTION SUBCUTANEOUS
Status: DISCONTINUED | OUTPATIENT
Start: 2021-12-21 | End: 2021-12-21 | Stop reason: HOSPADM

## 2021-12-21 RX ADMIN — SODIUM CHLORIDE, POTASSIUM CHLORIDE, SODIUM LACTATE AND CALCIUM CHLORIDE 500 ML: 600; 310; 30; 20 INJECTION, SOLUTION INTRAVENOUS at 18:40

## 2021-12-21 RX ADMIN — Medication: at 20:33

## 2021-12-21 RX ADMIN — MISOPROSTOL 25 MCG: 100 TABLET ORAL at 02:53

## 2021-12-21 RX ADMIN — MISOPROSTOL 25 MCG: 100 TABLET ORAL at 07:27

## 2021-12-21 RX ADMIN — BUPIVACAINE HYDROCHLORIDE 10 ML: 2.5 INJECTION, SOLUTION EPIDURAL; INFILTRATION; INTRACAUDAL at 19:56

## 2021-12-21 RX ADMIN — MISOPROSTOL 25 MCG: 100 TABLET ORAL at 00:51

## 2021-12-21 RX ADMIN — SODIUM CHLORIDE, POTASSIUM CHLORIDE, SODIUM LACTATE AND CALCIUM CHLORIDE 1000 ML: 600; 310; 30; 20 INJECTION, SOLUTION INTRAVENOUS at 20:05

## 2021-12-21 RX ADMIN — MISOPROSTOL 25 MCG: 100 TABLET ORAL at 15:57

## 2021-12-21 RX ADMIN — Medication 2 MILLI-UNITS/MIN: at 18:45

## 2021-12-21 RX ADMIN — MISOPROSTOL 25 MCG: 100 TABLET ORAL at 11:31

## 2021-12-21 RX ADMIN — ONDANSETRON 4 MG: 4 TABLET, ORALLY DISINTEGRATING ORAL at 05:02

## 2021-12-21 RX ADMIN — MISOPROSTOL 25 MCG: 100 TABLET ORAL at 09:28

## 2021-12-21 RX ADMIN — ONDANSETRON 4 MG: 4 TABLET, ORALLY DISINTEGRATING ORAL at 19:33

## 2021-12-21 RX ADMIN — CALCIUM CARBONATE (ANTACID) CHEW TAB 500 MG 500 MG: 500 CHEW TAB at 17:34

## 2021-12-21 RX ADMIN — MISOPROSTOL 25 MCG: 100 TABLET ORAL at 05:03

## 2021-12-21 RX ADMIN — MISOPROSTOL 25 MCG: 100 TABLET ORAL at 13:31

## 2021-12-21 NOTE — PROGRESS NOTES
Pt rested through the night, receiving cytotec every 2 hours.  States is starting to feel contractions in lower abdomen and low back.  This writer recommended pt to get up on birthing ball and moving as able.  Pt in agreement with this plan.  Has asked couple times about pain medication, this writer explained nitrous, IV and epidural.  Pt states she feels she can cope for now, will let staff know if wanting pain medication.  States nitrous was not effective for her during last delivery, so is not interested in trying that this time.

## 2021-12-21 NOTE — H&P
"OB HISTORY AND PHYSICAL UPDATE ADMISSION EXAM    Melva Adams  1990  [id number]    HPI: 30 yo  at 40+5 weeks admitted for labor induction.     Estimated Date of Delivery: Dec 15, 2021                       EGA 40w5d    OB History    Para Term  AB Living   2 1 1 0 0 1   SAB IAB Ectopic Multiple Live Births   0 0 0 0 1      # Outcome Date GA Lbr Nicola/2nd Weight Sex Delivery Anes PTL Lv   2 Current            1 Term 18 41w1d 35:08 :13 3.37 kg (7 lb 6.9 oz) F Vag-Spont EPI  FLAQUITO      Name: PARI,FEMALE-MELVA      Apgar1: 7  Apgar5: 9       Prenatal Complications   None    Exam:    Temp 98.5  F (36.9  C) (Oral)   Resp 20   Ht 1.753 m (5' 9\")   BMI 23.78 kg/m          HEENT          WNL              Heart              WNL               Lungs             WNL                      Abdomen        WNL                       Extremities     WNL                     Vaginal exam  1-2 cm/50-70%/-2, vertex per RN      Fetal Status   Category I    Assessment  IUP at 40+5 weeks  Induction of labor for postdates  Vertex per RN   GBS negative    Plan:  Cytotec for cervical ripening  Pitocin when ready, AROM when able  Anticipate     Collin William M.D.  "

## 2021-12-22 PROCEDURE — 250N000013 HC RX MED GY IP 250 OP 250 PS 637: Performed by: OBSTETRICS & GYNECOLOGY

## 2021-12-22 PROCEDURE — 120N000001 HC R&B MED SURG/OB

## 2021-12-22 RX ADMIN — DOCUSATE SODIUM 100 MG: 100 CAPSULE, LIQUID FILLED ORAL at 08:45

## 2021-12-22 RX ADMIN — IBUPROFEN 800 MG: 800 TABLET, FILM COATED ORAL at 08:45

## 2021-12-22 RX ADMIN — DOCUSATE SODIUM 100 MG: 100 CAPSULE, LIQUID FILLED ORAL at 01:05

## 2021-12-22 RX ADMIN — IBUPROFEN 800 MG: 800 TABLET, FILM COATED ORAL at 15:34

## 2021-12-22 RX ADMIN — Medication: at 21:14

## 2021-12-22 RX ADMIN — IBUPROFEN 600 MG: 600 TABLET, FILM COATED ORAL at 01:07

## 2021-12-22 NOTE — PROGRESS NOTES
Mcneil catheter removed with SVE. 7/80-90%/-1. Pitocin remains off. In-House provider aware of FHR tracing.

## 2021-12-22 NOTE — PROGRESS NOTES
Vital signs WDL, afebrile. Fundus firm at -1 below umbilicus; light bleeding. Reports continued heaviness in lower extremities post-epidural. Did not get up to bathroom at this time. Straight cathed at 2220 right before delivery. Pericare performed in bed. Mattress pad placed on bed and linens changed. Bedside report given to MONIQUE Hudson. Care relinquished.

## 2021-12-22 NOTE — PROGRESS NOTES
Report received from MONIQUE Landin. Care assumed at 1920. At bedside prepping for epidural procedure. Anesthesia arrived to bedside to place at 1939. Consent signed at 1939. Positioned semi-Alegre's with left tilt after procedure.

## 2021-12-22 NOTE — PLAN OF CARE
Problem: Adjustment to Role Transition (Postpartum Vaginal Delivery)  Goal: Successful Maternal Role Transition  Outcome: Improving     Problem: Pain (Postpartum Vaginal Delivery)  Goal: Acceptable Pain Control  Outcome: Improving     Problem: Urinary Retention (Postpartum Vaginal Delivery)  Goal: Effective Urinary Elimination  Outcome: Improving       Vitals and assessments WNL.  Up to bathroom to void. Breastfeeding approx q2h  Rates pain 1/10.  PRN ibuprofen given at 0105

## 2021-12-22 NOTE — L&D DELIVERY NOTE
OB Vaginal Delivery Note    Melva Adams MRN# [id number]   Age: 31 year old YOB: 1990       GA: 40w6d  GP:   Labor Complications: None   EBL:   mL  Delivery QBL:    Delivery Type: Vaginal, Spontaneous   ROM to Delivery Time: rupture date or rupture time have not been documented   Weight:     1 Minute 5 Minute 10 Minute   Apgar Totals: 8   9        HERMANN GARZA;CRUZITO REN     Delivery Details:  Melva Adams, a 31 year old  female delivered a viable infant with apgars of 8  and 9 . Patient was fully dilated and pushing after   hours   minutes in active labor. Delivery was via vaginal, spontaneous  to a sterile field under epidural  anesthesia. Infant delivered in vertex  left  occiput  anterior  position. Anterior and posterior shoulders delivered without difficulty. The cord was clamped, cut twice and 3 vessels  were noted. Cord blood was obtained in routine fashion with the following disposition: lab .      Cord complications: none   Placenta delivered at 2021 10:34 PM . Placental disposition was Hospital disposal . Fundal massage performed and fundus found to be firm.     Episiotomy: none    Perineum, vagina, cervix were inspected, and the following lacerations were noted:   Perineal lacerations: 1st                Any lacerations were repaired in the usual fashion using 3-0 vicryl.    Excellent hemostasis was noted. Needle count correct. Infant and patient in delivery room in good and stable condition.        Geneva Adams-Melva [[id number]]    Labor Event Times    Labor onset date: 21 Onset time:  9:00 PM   Dilation complete date: 21 Complete time: 10:23 PM   Start pushing date/time: 2021 2228      Labor Events     labor?: No   steroids: None  Labor Type: Induction/Cervical ripening  Predominate monitoring during 1st stage: continuous electronic fetal monitoring     Antibiotics received during labor?: No     Induction:  Misoprostol, Mechanical ripening agent, Oxytocin  Induction date/time:     Cervical ripening date/time:     Indications for induction: Elective     Delivery/Placenta Date and Time    Delivery Date: 21 Delivery Time: 10:28 PM   Placenta Date/Time: 2021 10:34 PM  Delivering clinician: Mady Muñiz,    Other personnel present at delivery:  Provider Role   Barbie Davalos RN Registered Nurse   Jessica Ren RN Registered Nurse         Vaginal Counts     Initial count performed by 2 team members:  Two Team Members   Dr. Sophia Davalos RN       Needles Suture Needles Sponges (RETIRED) Instruments   Initial counts 0 0 5    Added to count       Relief counts       Final counts 0 0 5          Placed during labor Accounted for at the end of labor   FSE No NA   IUPC No NA   Cervadil No NA              Final count performed by 2 team members:  Two Team Members   Dr. Sophia Davalos RN      Final count correct?: Yes     Apgars    Living status: Living   1 Minute 5 Minute 10 Minute 15 Minute 20 Minute   Skin color: 0  1       Heart rate: 2  2       Reflex irritability: 2  2       Muscle tone: 2  2       Respiratory effort: 2  2       Total: 8  9       Apgars assigned by: KRISTY REN RN     Cord    Vessels: 3 Vessels    Cord Complications: None               Cord Blood Disposition: Lab    Gases Sent?: No    Delayed cord clamping?: Yes    Cord Clamping Delay (seconds):  seconds    Stem cell collection?: No       Diablo Resuscitation    Methods: None   Care at Delivery: Dried and Stimulated       Labor Events and Shoulder Dystocia    Fetal Tracing Prior to Delivery: Category 2  Shoulder dystocia present?: Neg     Delivery (Maternal) (Provider to Complete) (265111)    Episiotomy: None  Perineal lacerations: 1st Repaired?: Yes   Repair suture: None  Genital tract inspection done: Pos     Blood Loss  Mother: Jason Adams AMOS #[id number]   Start of Mother's Information    Delivery Blood  Loss  12/21/21 2100 - 12/21/21 2248    None           End of Mother's Information  Mother: Jason Adams #[id number]          Delivery - Provider to Complete ([id number])    Delivering clinician: Mady Muñiz,   Delivery Type (Choose the 1 that will go to the Birth History): Vaginal, Spontaneous                   Other personnel:  Provider Role   Barbie Davalos RN Registered Nurse   Jessica Staples RN Registered Nurse                Placenta    Date/Time: 12/21/2021 10:34 PM  Removal: Spontaneous  Disposition: Hospital disposal           Anesthesia    Method: Epidural  Cervical dilation at placement: 0-3                Presentation and Position    Presentation: Vertex    Position: Left Occiput Anterior                 Nany Cortes MD

## 2021-12-22 NOTE — PLAN OF CARE
Problem: Adult Inpatient Plan of Care  Goal: Optimal Comfort and Wellbeing  Outcome: Improving     Problem: Adjustment to Role Transition (Postpartum Vaginal Delivery)  Goal: Successful Maternal Role Transition  Outcome: Improving     Problem: Pain (Postpartum Vaginal Delivery)  Goal: Acceptable Pain Control  Outcome: Improving  Intervention: Prevent or Manage Pain  Recent Flowsheet Documentation  Taken 12/22/2021 0900 by Anne Ocasio RN  Pain Management Interventions: medication (see MAR)     VSS per patients stated baseline. Offers complaints of cramping during and after breastfeeding session, patient utilizing tylenol and ibuprofen with adequate relief.

## 2021-12-22 NOTE — PROGRESS NOTES
"OB Post Partum Note    ASSESSMENT: PLAN:     PPD#1 spontaneous vaginal delivery  Doing well  Routine cares  Anticipate discharge to home in am    SUBJECTIVE:   no complaints, denies fever, chills.  Tolerating po, ambulating.  Baby doing well    OBJECTIVE:    BP (!) 77/54 (BP Location: Left arm, Patient Position: Semi-Alegre's)   Pulse 65   Temp 98.5  F (36.9  C) (Oral)   Resp 18   Ht 1.753 m (5' 9\")   Wt 81.7 kg (180 lb 3.2 oz)   SpO2 99%   Breastfeeding Unknown   BMI 26.61 kg/m        abd- fundus firm  Ext- no tenderness,   cv- regular rate  Lungs- clear    Jami Smith MD  [facility]  [phone]    "

## 2021-12-22 NOTE — ANESTHESIA PROCEDURE NOTES
Epidural catheter Procedure Note    Pre-Procedure   Staff -        Anesthesiologist:  Tamiko Lobo MD       Performed By: anesthesiologist       Location: OB       Procedure Start/Stop Times: 12/21/2021 7:41 PM and 12/21/2021 8:10 PM       Pre-Anesthestic Checklist: patient identified, IV checked, risks and benefits discussed, informed consent, monitors and equipment checked, pre-op evaluation, at physician/surgeon's request and post-op pain management  Timeout:       Correct Patient: Yes        Correct Procedure: Yes        Correct Site: Yes        Correct Position: Yes   Procedure Documentation  Procedure: epidural catheter       Patient Position: sitting       Patient Prep/Sterile Barriers: sterile gloves, patient draped       Skin prep: Chloraprep       Local skin infiltrated with 2 mL of 1% lidocaine.        Insertion Site: L3-4. (midline approach).       Technique: LORT saline        GARLAND at 5 cm.       Needle Type: ToAugmented Pixels COy needle       Needle Gauge: 17.        Needle Length (Inches): 3.5        Catheter: 19 G.         Catheter threaded easily.         6 cm epidural space.         Threaded 11 cm at skin.         # of attempts: 2 and  # of redirects:  0    Assessment/Narrative         Paresthesias: No.       Test dose of 3 mL lidocaine 1.5% w/ 1:200,000 epinephrine at 19:52 CST.         Test dose negative, 3 minutes after injection, for signs of intravascular, subdural, or intrathecal injection.       Insertion/Infusion Method: LORT saline       No aspiration negative for Heme or CSF via Epidural Catheter.    Medication(s) Administered   0.25% Bupivacaine PF (Epidural), 10 mL  Medication Administration Time: 12/21/2021 7:56 PM

## 2021-12-22 NOTE — PROGRESS NOTES
S: Delivery  B:  at 40.6 weeks gestation here for induction of labor. Allergies: Gluten, Milk, Soy. Labs: B Positive, GBS: Negative, Rubella: Immune, HIV: Non-Reactive, Hep B: Non-Reactive. Pregnancy Related Complications: None.   A:  of viable baby girl at 2228. Spontaneous cry. Dried and stimulated. Cord clamping delayed 60+ seconds then clamped and cut. Infant placed skin to skin with mom. APGARs 8 & 9. Weight: 3030 grams - AGA.  IV Pitocin initiated after delivery of the placenta. Laceration: 1st degree. QBL: 100mL. Declines Tylenol and ibuprofen at this time. Legs remain very heavy and numb from epidural.   R: Continue with routine post-delivery care.

## 2021-12-22 NOTE — ANESTHESIA PREPROCEDURE EVALUATION
Anesthesia Pre-Procedure Evaluation    Patient: Melva Adams   MRN: [id number] : 1990        Preoperative Diagnosis: * No pre-op diagnosis entered *    Procedure : * No procedures listed *          Past Medical History:   Diagnosis Date     Mental disorder     anxiety     Nausea     the whole pregnancy and hyperemesis with last baby 3 years ago      Past Surgical History:   Procedure Laterality Date     BUNIONECTOMY  2003    x3     DILATION AND CURETTAGE  2017    left ovary removed     OTHER SURGICAL HISTORY Left 2017    left ovary      Allergies   Allergen Reactions     Gluten [Gluten Meal] Diarrhea     sensitivity     Milk [Lac Bovis] Diarrhea     Milk products     Soy [Isoflavones] Diarrhea      Social History     Tobacco Use     Smoking status: Never Smoker     Smokeless tobacco: Never Used   Substance Use Topics     Alcohol use: No      Wt Readings from Last 1 Encounters:   21 81.7 kg (180 lb 3.2 oz)        Anesthesia Evaluation   Pt has had prior anesthetic. Type: Regional.    History of anesthetic complications (low BP with previous labor epidural)       ROS/MED HX  ENT/Pulmonary:  - neg pulmonary ROS     Neurologic:  - neg neurologic ROS     Cardiovascular:  - neg cardiovascular ROS     METS/Exercise Tolerance: >4 METS    Hematologic:  - neg hematologic  ROS     Musculoskeletal:  - neg musculoskeletal ROS     GI/Hepatic:  - neg GI/hepatic ROS     Renal/Genitourinary:  - neg Renal ROS     Endo:  - neg endo ROS     Psychiatric/Substance Use:       Infectious Disease:  - neg infectious disease ROS     Malignancy:       Other:      (+) Possibly pregnant (Pregnant), ,         Physical Exam    Airway         TM distance: > 3 FB      Respiratory Devices and Support         Dental           Cardiovascular             Pulmonary                   OUTSIDE LABS:  CBC:   Lab Results   Component Value Date    WBC 7.3 2019    WBC 8.5 2018    HGB 13.2 2019    HGB 9.5 (L) 2018    HCT  39.4 01/19/2019    HCT 36.0 08/16/2018     01/19/2019     (L) 08/16/2018     BMP: No results found for: NA, POTASSIUM, CHLORIDE, CO2, BUN, CR, GLC  COAGS: No results found for: PTT, INR, FIBR  POC: No results found for: BGM, HCG, HCGS  HEPATIC:   Lab Results   Component Value Date    ALT 14 08/16/2018    AST 19 08/16/2018     OTHER: No results found for: PH, LACT, A1C, WERNER, PHOS, MAG, LIPASE, AMYLASE, TSH, T4, T3, CRP, SED    Anesthesia Plan    ASA Status:  2      Anesthesia Type: Epidural.              Consents    Anesthesia Plan(s) and associated risks, benefits, and realistic alternatives discussed. Questions answered and patient/representative(s) expressed understanding.     - Discussed: Risks, Benefits and Alternatives for the PROCEDURE were discussed     - Discussed with:  Patient         Postoperative Care            Comments:    Other Comments: Epidural for labor            Tamiko Lobo MD

## 2021-12-22 NOTE — PROGRESS NOTES
Labor Progress Note:           Interval History:     The patient is comfortable.  She is feeling contractions however they are not painful.         Objective:     FHT: 140bpm, moderate variability, +accels, neg decels  Wil[place]it: ctx q3-5mins  SVE: 2cm/70%/-1    Labs:  No results found for: NTBNPI, NTBNP  Lab Results   Component Value Date    HGB 13.2 2019     Lab Results   Component Value Date     2019     No results found for: BUN, CR      Assessment:     30yo  at 40w6d  Postdate pregnancy         Plan:     Administer last dose of cytotec to complete  in 24 hours  Cook catheter placed without difficulty and filled to 80cc in both balloons  Start pitocin 2 hours after last cytotec dose  Epidural/IV pains meds at patient's request    Mady Muñiz,   [facility]

## 2021-12-23 VITALS
HEIGHT: 69 IN | DIASTOLIC BLOOD PRESSURE: 60 MMHG | SYSTOLIC BLOOD PRESSURE: 110 MMHG | WEIGHT: 180.2 LBS | RESPIRATION RATE: 14 BRPM | TEMPERATURE: 98.4 F | BODY MASS INDEX: 26.69 KG/M2 | OXYGEN SATURATION: 96 % | HEART RATE: 65 BPM

## 2021-12-23 PROBLEM — Z36.89 ENCOUNTER FOR TRIAGE IN PREGNANT PATIENT: Status: RESOLVED | Noted: 2021-12-20 | Resolved: 2021-12-23

## 2021-12-23 PROCEDURE — 250N000013 HC RX MED GY IP 250 OP 250 PS 637: Performed by: OBSTETRICS & GYNECOLOGY

## 2021-12-23 RX ADMIN — IBUPROFEN 800 MG: 800 TABLET, FILM COATED ORAL at 06:47

## 2021-12-23 RX ADMIN — DOCUSATE SODIUM 100 MG: 100 CAPSULE, LIQUID FILLED ORAL at 11:22

## 2021-12-23 RX ADMIN — IBUPROFEN 800 MG: 800 TABLET, FILM COATED ORAL at 00:21

## 2021-12-23 NOTE — DISCHARGE INSTRUCTIONS
You have a Home Care nurse visit planned for Friday, 12/24/21. The nurse will contact you to confirm the appointment time. If you do not receive a call by Friday morning, please call Home Care at [phone]. Please do not schedule a clinic appointment on the same day as home nurse visit.        Postpartum Vaginal Delivery Instructions    Activity       Ask family and friends for help when you need it.    Do not place anything in your vagina for 6 weeks.    You are not restricted on other activities, but take it easy for a few weeks to allow your body to recover from delivery.  You are able to do any activities you feel up to that point.    No driving until you have stopped taking your pain medications (usually two weeks after delivery).     Call your health care provider if you have any of these symptoms:       Increased pain, swelling, redness, or fluid around your stiches from an episiotomy or perineal tear.    A fever above 100.4 F (38 C) with or without chills when placing a thermometer under your tongue.    You soak a sanitary pad with blood within 1 hour, or you see blood clots larger than a golf ball.    Bleeding that lasts more than 6 weeks.    Vaginal discharge that smells bad.    Severe pain, cramping or tenderness in your lower belly area.    A need to urinate more frequently (use the toilet more often), more urgently (use the toilet very quickly), or it burns when you urinate.    Nausea and vomiting.    Redness, swelling or pain around a vein in your leg.    Problems breastfeeding or a red or painful area on your breast.    Chest pain and cough or are gasping for air.    Problems coping with sadness, anxiety, or depression.  If you have any concerns about hurting yourself or the baby, call your provider immediately.     You have questions or concerns after you return home.     Keep your hands clean:  Always wash your hands before touching your perineal area and stitches.  This helps reduce your risk of  infection.  If your hands aren't dirty, you may use an alcohol hand-rub to clean your hands. Keep your nails clean and short.

## 2021-12-23 NOTE — PROVIDER NOTIFICATION
12/23/21 0000   Vitals   BP (!) 82/55   Temp 98.6  F (37  C)   Temp src Oral   Pulse 67   Resp 18   Blood Pressure is low but pt stated it is her baseline.

## 2021-12-23 NOTE — PLAN OF CARE
Problem: Pain (Postpartum Vaginal Delivery)  Goal: Acceptable Pain Control  Outcome: Improving   Pt uses Ibuprofen for abdominal cramping which is effective.

## 2021-12-23 NOTE — PROGRESS NOTES
Discharge instructions reviewed with [place] and spouse. Questions discussed and answered.   Patient follow up in 6 weeks.   Verbalizes and demonstrates understanding.

## 2021-12-23 NOTE — PROGRESS NOTES
"Outreach Note for [facility]    Melva Adams  [id number]  1990    Chart reviewed, discharge plan discussed with patient, Melva, needs assessed. Melva verbalizes understanding of plan, requests home care visit, nurse visit planned for Friday, , Home Care Intake updated. Address and phone number reported as being correct in EMR. Follow-up post-delivery appointment with  planned in 6 weeks at [facility].    Melva states she has good support at home, has baby care essentials, and feels ready to discharge today with , \"Inna\". Outreach RN will continue to follow and assist if needed with discharge plan. No additional needs identified at this time.    "

## 2021-12-23 NOTE — DISCHARGE SUMMARY
[facility] Discharge Summary    Melva Adams MRN# [id number]   Age: 31 year old YOB: 1990     Date of Admission:  12/20/2021  Date of Discharge::  12/23/2021  Admitting Physician:  Collin William MD  Discharge Physician:  Jami Smith MD     [facility]            Admission Diagnoses:   Post dates pregnancy   Encounter for elective induction of labor [Z34.90]          Discharge Diagnosis:   Normal spontaneous vaginal delivery  Intrauterine pregnancy at 40 weeks gestation          Procedures:   Procedure(s):  induction of labor  spontaneous vaginal delivery                    Medications Prior to Admission:     Medications Prior to Admission   Medication Sig Dispense Refill Last Dose     calcium carbonate-vitamin D3 (CALCIUM 500 + D, D3,) 500 mg(1,250mg) -125 unit per tablet [CALCIUM CARBONATE-VITAMIN D3 (CALCIUM 500 + D, D3,) 500 MG(1,250MG) -125 UNIT PER TABLET] Take 1 capsule by mouth daily.   Unknown at Unknown time     dietary supplement cap [DIETARY SUPPLEMENT CAP] Take 1 capsule by mouth daily.   12/20/2021 at Unknown time     docusate sodium (COLACE) 100 MG capsule [DOCUSATE SODIUM (COLACE) 100 MG CAPSULE] Take 100 mg by mouth 2 (two) times a day.   12/20/2021 at Unknown time     ondansetron (ZOFRAN) 4 MG tablet Take 4 mg by mouth once   12/20/2021 at Unknown time     polymyxin B-trimethoprim (FOR POLYTRIM) 10,000 unit- 1 mg/mL Drop ophthalmic drops [POLYMYXIN B-TRIMETHOPRIM (FOR POLYTRIM) 10,000 UNIT- 1 MG/ML DROP OPHTHALMIC DROPS] Administer 1 drop to both eyes 4 (four) times a day. 10 mL 0 Unknown at Unknown time     predniSONE (DELTASONE) 20 MG tablet [PREDNISONE (DELTASONE) 20 MG TABLET] 40mg daily for 3-5 days. 15 tablet 0 Unknown at Unknown time             Discharge Medications:     Current Discharge Medication List      CONTINUE these medications which have NOT CHANGED    Details   calcium carbonate-vitamin D3 (CALCIUM 500 + D, D3,) 500  mg(1,250mg) -125 unit per tablet [CALCIUM CARBONATE-VITAMIN D3 (CALCIUM 500 + D, D3,) 500 MG(1,250MG) -125 UNIT PER TABLET] Take 1 capsule by mouth daily.      dietary supplement cap [DIETARY SUPPLEMENT CAP] Take 1 capsule by mouth daily.      docusate sodium (COLACE) 100 MG capsule [DOCUSATE SODIUM (COLACE) 100 MG CAPSULE] Take 100 mg by mouth 2 (two) times a day.      ondansetron (ZOFRAN) 4 MG tablet Take 4 mg by mouth once      polymyxin B-trimethoprim (FOR POLYTRIM) 10,000 unit- 1 mg/mL Drop ophthalmic drops [POLYMYXIN B-TRIMETHOPRIM (FOR POLYTRIM) 10,000 UNIT- 1 MG/ML DROP OPHTHALMIC DROPS] Administer 1 drop to both eyes 4 (four) times a day.  Qty: 10 mL, Refills: 0    Associated Diagnoses: Acute bacterial conjunctivitis of both eyes      predniSONE (DELTASONE) 20 MG tablet [PREDNISONE (DELTASONE) 20 MG TABLET] 40mg daily for 3-5 days.  Qty: 15 tablet, Refills: 0    Associated Diagnoses: Acute pharyngitis, unspecified etiology                   Consultations:   No consultations were requested during this admission          Brief History of Labor:   . patient admitted for induction, received cytotec for cervical ripening and pitocin, progressed to complete, vaginal delivery without complications.             Hospital Course:   The patient's hospital course was unremarkable.  On discharge, her pain was well controlled.Vaginal bleeding is similar to peak menstrual flow.  Voiding without difficulty.  Ambulating well and tolerating a normal diet.  No fever.  Breastfeeding well.  Infant is stable.  No bowel movement yet.*  She was discharged on post-partum day #2.    Post-partum hemoglobin:   Hemoglobin   Date Value Ref Range Status   01/19/2019 13.2 12.0 - 16.0 g/dL Final             Discharge Instructions and Follow-Up:   Discharge diet: Regular   Discharge activity: No sex for 6 week(s)   Discharge follow-up: Follow up with Dr. Cortes  in 6 weeks   Wound care:            Discharge Disposition:   Discharged to  home      Attestation:  I have reviewed today's vital signs, notes, medications, labs and imaging.    Jami Smith MD

## 2022-01-21 ENCOUNTER — MEDICAL CORRESPONDENCE (OUTPATIENT)
Dept: HEALTH INFORMATION MANAGEMENT | Facility: CLINIC | Age: 32
End: 2022-01-21
Payer: COMMERCIAL

## 2022-02-23 ENCOUNTER — MEDICAL CORRESPONDENCE (OUTPATIENT)
Dept: HEALTH INFORMATION MANAGEMENT | Facility: CLINIC | Age: 32
End: 2022-02-23
Payer: COMMERCIAL

## 2022-04-25 ENCOUNTER — MEDICAL CORRESPONDENCE (OUTPATIENT)
Dept: HEALTH INFORMATION MANAGEMENT | Facility: CLINIC | Age: 32
End: 2022-04-25
Payer: COMMERCIAL

## 2022-06-07 ENCOUNTER — MEDICAL CORRESPONDENCE (OUTPATIENT)
Dept: HEALTH INFORMATION MANAGEMENT | Facility: CLINIC | Age: 32
End: 2022-06-07

## 2022-06-07 ENCOUNTER — NURSE TRIAGE (OUTPATIENT)
Dept: NURSING | Facility: CLINIC | Age: 32
End: 2022-06-07

## 2022-06-07 ENCOUNTER — VIRTUAL VISIT (OUTPATIENT)
Dept: FAMILY MEDICINE | Facility: CLINIC | Age: 32
End: 2022-06-07
Payer: COMMERCIAL

## 2022-06-07 DIAGNOSIS — R50.9 FEBRILE ILLNESS, ACUTE: ICD-10-CM

## 2022-06-07 DIAGNOSIS — R52 BODY ACHES: ICD-10-CM

## 2022-06-07 DIAGNOSIS — J02.9 SORE THROAT: ICD-10-CM

## 2022-06-07 DIAGNOSIS — R09.82 PND (POST-NASAL DRIP): Primary | ICD-10-CM

## 2022-06-07 DIAGNOSIS — H10.023 PINK EYE DISEASE OF BOTH EYES: ICD-10-CM

## 2022-06-07 DIAGNOSIS — R09.81 NASAL CONGESTION: ICD-10-CM

## 2022-06-07 PROCEDURE — 99214 OFFICE O/P EST MOD 30 MIN: CPT | Mod: GT | Performed by: INTERNAL MEDICINE

## 2022-06-07 NOTE — PROGRESS NOTES
Jason is a 32 year old who is being evaluated via a billable video visit.      How would you like to obtain your AVS? MyChart  If the video visit is dropped, the invitation should be resent by: Text to cell phone: [phone]  Will anyone else be joining your video visit? No      Video Start Time: 12:05    Assessment & Plan   Problem List Items Addressed This Visit    None     Visit Diagnoses     PND (post-nasal drip)    -  Primary    Nasal congestion        Sore throat        Pink eye disease of both eyes        Febrile illness, acute        Body aches             Patient is currently nursing her 5-month-old.  Probably viral URI with viral conjunctivitis and sore throat.  Advised to repeat her COVID testing preferable PCR test.  Continue supportive treatment for now.  Discussed medications and risk with nursing.  Patanol eyedrops and Polytrim eyedrops have no proven safety with nursing.  Will discuss with MTM.  Adequate eye hygiene can use natural tears, nasal saline rinses.  Using Flonase safety unknown with nursing .  Can take Tylenol as needed.  Keep well-hydrated,  honey in tea etc. humidifier and supportive treatment   Doubt that this is a secondary pneumonia or bacterial sinusitis, her rapid strep test was negative as well as mono test was negative, both done in urgent care         See Patient Instructions    Return if symptoms worsen or fail to improve, for As needed and if symptoms worsen.    Harpal Wharton MD  [facility]  Total time spent was 36 minutes review of records exam virtual visit and coordination of care with MTM discussing all medications and interactions  Nurse as well as addressing multiple health issues including conjunctivitis sinus issues fever.  Subjective   Jason is a 32 year old who presents for the following health issues       Concern - Eye problem  Onset: 1 day  Description: Pt woke up with red, goopy eyes  Intensity: moderate  Progression of Symptoms:   worsening  Accompanying Signs & Symptoms:   Previous history of similar problem:   Precipitating factors:        Worsened by:   Alleviating factors:        Improved by:   Therapies tried and outcome: None    Went to  yesterday, prescribed amoxicillin for sinus infection, rule out strep and mono, nasal congestion, nasal dripping throat and swelling and sore.this morning had pink eyes and itchy . Has not started taking amoxicillin, had negative COVID test last Tuesday , symptoms since then, had fever of 104 F, Thursday , 103 , 99 CHILL S BODY ACHES, has a lot of pressure and pain behind eyes and headache, fever went down , nursing her 5 months    Pediatrician just saw, struggling, can not breathe and suck, had double ear infection,     Sticky eye discharge itchy, taking ibuoprofen and tylenOl alternating     No allergies, food allergies    Review of Systems   Constitutional, HEENT, cardiovascular, pulmonary, gi and gu systems are negative, except as otherwise noted.      Objective    Vitals - Patient Reported  Weight (Patient Reported): 77.1 kg (170 lb)      Vitals:  No vitals were obtained today due to virtual visit.    Physical Exam   GENERAL: Healthy, alert and no distress  EYES: conjunctiva/corneas- conjunctival injection bilateral no periorbital edema or swelling or redness or evidence of cellulitis.  RESP: No audible wheeze, cough, or visible cyanosis.  No visible retractions or increased work of breathing.    SKIN: Visible skin clear. No significant rash, abnormal pigmentation or lesions.  NEURO: Cranial nerves grossly intact.  Mentation and speech appropriate for age.  PSYCH: Mentation appears normal, affect normal/bright, judgement and insight intact, normal speech and appearance well-groomed.    Admission on 12/20/2021, Discharged on 12/23/2021   Component Date Value Ref Range Status     Hepatitis B Surface Antigen (Exter* 06/09/2021 Nonreactive  Nonreactive Final     Treponema Antibody Total 12/20/2021  Nonreactive  Nonreactive Final     ABO/RH(D) 12/20/2021 B POS   Final     Antibody Screen 12/20/2021 Negative  Negative Final     SPECIMEN EXPIRATION DATE 12/20/2021 [id number]   Final     Hold Specimen 12/20/2021 [facility]   Final               Video-Visit Details    Type of service:  Video Visit    Video End Time: 12:30 PM    Originating Location (pt. Location): Home    Distant Location (provider location):  [facility]     [facility]

## 2022-06-07 NOTE — PROGRESS NOTES
"Jason is a 32 year old who is being evaluated via a billable video visit.      How would you like to obtain your AVS? MyChart  If the video visit is dropped, the invitation should be resent by: Text to cell phone: [phone]  Will anyone else be joining your video visit? No  {If patient encounters technical issues they should call [phone] :[id number]}    Video Start Time: {video visit start/end time for provider to select:209427}    {PROVIDER CHARTING PREFERENCE:305579}    Subjective   Jason is a 32 year old who presents for the following health issues {ACCOMPANIED BY STATEMENT (Optional):019351}         Acute Illness    Onset/Duration: ***  Symptoms:  Fever: {.:034134::\"no\"}  Chills/Sweats: {.:178299::\"no\"}  Headache (location?): {.:328724::\"no\"}  Sinus Pressure: {.:322088::\"no\"}  Conjunctivitis:  {.:589630::\"no\"}  Ear Pain: {.:050990::\"no\"}  Rhinorrhea: {.:522832::\"no\"}  Congestion: {.:905723::\"no\"}  Sore Throat: {.:123581::\"no\"}  Cough: {.:042555::\"no\"}  Wheeze: {.:653354::\"no\"}  Decreased Appetite: {.:978015::\"no\"}  Nausea: {.:502377::\"no\"}  Vomiting: {.:667421::\"no\"}  Diarrhea: {.:748473::\"no\"}  Dysuria/Freq.: {.:118113::\"no\"}  Dysuria or Hematuria: {.:510757::\"no\"}  Fatigue/Achiness: {.:345939::\"no\"}  Sick/Strep Exposure: {.:726096::\"no\"}  Therapies tried and outcome: {NONEORCHOOSE:980968::\"None\"}  {additonal problems for provider to add (Optional):243605}    Review of Systems   {ROS COMP (Optional):791266}      Objective           Vitals:  No vitals were obtained today due to virtual visit.    Physical Exam   {video visit exam brief selected:820020::\"GENERAL: Healthy, alert and no distress\",\"EYES: Eyes grossly normal to inspection.  No discharge or erythema, or obvious scleral/conjunctival abnormalities.\",\"RESP: No audible wheeze, cough, or visible cyanosis.  No visible retractions or increased work of breathing.  \",\"SKIN: Visible skin clear. No significant rash, abnormal pigmentation or lesions.\",\"NEURO: " "Cranial nerves grossly intact.  Mentation and speech appropriate for age.\",\"PSYCH: Mentation appears normal, affect normal/bright, judgement and insight intact, normal speech and appearance well-groomed.\"}    {Diagnostic Test Results (Optional):497968}    {AMBULATORY ATTESTATION (Optional):609650}        Video-Visit Details    Type of service:  Video Visit    Video End Time:{video visit start/end time for provider to select:178668}    Originating Location (pt. Location): {video visit patient location:799507::\"Home\"}    Distant Location (provider location):  [facility]     Platform used for Video Visit: {Virtual Visit Platforms:844256::\"[employer]\"}  "

## 2022-06-07 NOTE — TELEPHONE ENCOUNTER
Patient is calling and states that yesterday went to urgent care and prescribed amoxicillin for sinus infection.  Today woke up with pink eye.  Patient is calling and is wanting to know if amoxicillin will cover pink eye.  FNA advised to contact her PCP/Clinic and patient agrees.    COVID 19 Nurse Triage Plan/Patient Instructions    Please be aware that novel coronavirus (COVID-19) may be circulating in the community. If you develop symptoms such as fever, cough, or SOB or if you have concerns about the presence of another infection including coronavirus (COVID-19), please contact your health care provider or visit [email or web address].     Disposition/Instructions    Home care recommended. Follow home care protocol based instructions.    Thank you for taking steps to prevent the spread of this virus.  o Limit your contact with others.  o Wear a simple mask to cover your cough.  o Wash your hands well and often.    Resources    [facility]: About COVID-19: [email or web address]    CDC: What to Do If You're Sick: www.cdc.gov/coronavirus/2019-ncov/about/steps-when-sick.html    CDC: Ending Home Isolation: www.cdc.gov/coronavirus/2019-ncov/hcp/disposition-in-home-patients.html     CDC: Caring for Someone: www.cdc.gov/coronavirus/2019-ncov/if-you-are-sick/care-for-someone.html     [facility]: Interim Guidance for Hospital Discharge to Home: www.health.[facility].[place]/diseases/coronavirus/hcp/hospdischarge.pdf    [facility] clinical trials (COVID-19 research studies): [email or web address]     Below are the COVID-19 hotlines at the [place] [facility] ([facility]). Interpreters are available.   o For health questions: Call 970-321-1470 or 1-510.795.6639 (7 a.m. to 7 p.m.)  o For questions about schools and childcare: Call [phone] or 1-987.481.8459 (7 a.m. to 7 p.m.)

## 2022-06-27 DIAGNOSIS — Z91.89 AT RISK FOR POSTPARTUM DEPRESSION: Primary | ICD-10-CM

## 2022-06-27 NOTE — PROGRESS NOTES
[place] Post-partum depression screen positive.    No suicidal ideation or thoughts of self-harm.    Patient states she does not have a PCP.    Referral to psychology entered.

## 2022-07-23 ENCOUNTER — HEALTH MAINTENANCE LETTER (OUTPATIENT)
Age: 32
End: 2022-07-23

## 2022-10-01 ENCOUNTER — HEALTH MAINTENANCE LETTER (OUTPATIENT)
Age: 32
End: 2022-10-01

## 2023-08-06 ENCOUNTER — HEALTH MAINTENANCE LETTER (OUTPATIENT)
Age: 33
End: 2023-08-06

## 2024-09-28 ENCOUNTER — HEALTH MAINTENANCE LETTER (OUTPATIENT)
Age: 34
End: 2024-09-28